# Patient Record
Sex: FEMALE | Race: WHITE | NOT HISPANIC OR LATINO | URBAN - METROPOLITAN AREA
[De-identification: names, ages, dates, MRNs, and addresses within clinical notes are randomized per-mention and may not be internally consistent; named-entity substitution may affect disease eponyms.]

---

## 2018-01-30 ENCOUNTER — OFFICE VISIT (OUTPATIENT)
Dept: OBSTETRICS AND GYNECOLOGY | Facility: CLINIC | Age: 23
End: 2018-01-30

## 2018-01-30 VITALS
HEART RATE: 67 BPM | HEIGHT: 64 IN | BODY MASS INDEX: 15.88 KG/M2 | DIASTOLIC BLOOD PRESSURE: 78 MMHG | WEIGHT: 93 LBS | SYSTOLIC BLOOD PRESSURE: 111 MMHG

## 2018-01-30 DIAGNOSIS — R30.0 DYSURIA: Primary | ICD-10-CM

## 2018-01-30 DIAGNOSIS — Z72.0 TOBACCO USE: ICD-10-CM

## 2018-01-30 DIAGNOSIS — N93.9 VAGINAL BLEEDING, ABNORMAL: ICD-10-CM

## 2018-01-30 LAB
B-HCG UR QL: NEGATIVE
BILIRUB BLD-MCNC: NEGATIVE MG/DL
GLUCOSE UR STRIP-MCNC: NEGATIVE MG/DL
INTERNAL NEGATIVE CONTROL: NEGATIVE
INTERNAL POSITIVE CONTROL: POSITIVE
KETONES UR QL: NEGATIVE
LEUKOCYTE EST, POC: NEGATIVE
Lab: NORMAL
NITRITE UR-MCNC: NEGATIVE MG/ML
PH UR: 7 [PH] (ref 5–8)
PROT UR STRIP-MCNC: ABNORMAL MG/DL
RBC # UR STRIP: NEGATIVE /UL
SP GR UR: 1.02 (ref 1–1.03)
UROBILINOGEN UR QL: NORMAL

## 2018-01-30 PROCEDURE — 99203 OFFICE O/P NEW LOW 30 MIN: CPT | Performed by: OBSTETRICS & GYNECOLOGY

## 2018-01-30 PROCEDURE — 99406 BEHAV CHNG SMOKING 3-10 MIN: CPT | Performed by: OBSTETRICS & GYNECOLOGY

## 2018-01-30 PROCEDURE — 81025 URINE PREGNANCY TEST: CPT | Performed by: OBSTETRICS & GYNECOLOGY

## 2018-01-30 RX ORDER — VITAMIN A ACETATE, .BETA.-CAROTENE, ASCORBIC ACID, CHOLECALCIFEROL, .ALPHA.-TOCOPHEROL ACETATE, DL-, THIAMINE MONONITRATE, RIBOFLAVIN, NIACINAMIDE, PYRIDOXINE HYDROCHLORIDE, FOLIC ACID, CYANOCOBALAMIN, CALCIUM CARBONATE, FERROUS FUMARATE, ZINC OXIDE, AND CUPRIC OXIDE 2000; 2000; 120; 400; 22; 1.84; 3; 20; 10; 1; 12; 200; 27; 25; 2 [IU]/1; [IU]/1; MG/1; [IU]/1; MG/1; MG/1; MG/1; MG/1; MG/1; MG/1; UG/1; MG/1; MG/1; MG/1; MG/1
TABLET ORAL
Refills: 0 | COMMUNITY
Start: 2017-11-02 | End: 2020-02-11

## 2018-01-30 RX ORDER — ALBUTEROL SULFATE 90 UG/1
AEROSOL, METERED RESPIRATORY (INHALATION)
Refills: 1 | COMMUNITY
Start: 2017-10-25 | End: 2020-02-11

## 2018-01-30 NOTE — PATIENT INSTRUCTIONS
Steps to Quit Smoking  Smoking tobacco can be harmful to your health and can affect almost every organ in your body. Smoking puts you, and those around you, at risk for developing many serious chronic diseases. Quitting smoking is difficult, but it is one of the best things that you can do for your health. It is never too late to quit.  What are the benefits of quitting smoking?  When you quit smoking, you lower your risk of developing serious diseases and conditions, such as:  · Lung cancer or lung disease, such as COPD.  · Heart disease.  · Stroke.  · Heart attack.  · Infertility.  · Osteoporosis and bone fractures.  Additionally, symptoms such as coughing, wheezing, and shortness of breath may get better when you quit. You may also find that you get sick less often because your body is stronger at fighting off colds and infections. If you are pregnant, quitting smoking can help to reduce your chances of having a baby of low birth weight.  How do I get ready to quit?  When you decide to quit smoking, create a plan to make sure that you are successful. Before you quit:  · Pick a date to quit. Set a date within the next two weeks to give you time to prepare.  · Write down the reasons why you are quitting. Keep this list in places where you will see it often, such as on your bathroom mirror or in your car or wallet.  · Identify the people, places, things, and activities that make you want to smoke (triggers) and avoid them. Make sure to take these actions:  ¨ Throw away all cigarettes at home, at work, and in your car.  ¨ Throw away smoking accessories, such as ashtrays and lighters.  ¨ Clean your car and make sure to empty the ashtray.  ¨ Clean your home, including curtains and carpets.  · Tell your family, friends, and coworkers that you are quitting. Support from your loved ones can make quitting easier.  · Talk with your health care provider about your options for quitting smoking.  · Find out what treatment  options are covered by your health insurance.  What strategies can I use to quit smoking?  Talk with your healthcare provider about different strategies to quit smoking. Some strategies include:  · Quitting smoking altogether instead of gradually lessening how much you smoke over a period of time. Research shows that quitting “cold turkey” is more successful than gradually quitting.  · Attending in-person counseling to help you build problem-solving skills. You are more likely to have success in quitting if you attend several counseling sessions. Even short sessions of 10 minutes can be effective.  · Finding resources and support systems that can help you to quit smoking and remain smoke-free after you quit. These resources are most helpful when you use them often. They can include:  ¨ Online chats with a counselor.  ¨ Telephone quitlines.  ¨ Printed self-help materials.  ¨ Support groups or group counseling.  ¨ Text messaging programs.  ¨ Mobile phone applications.  · Taking medicines to help you quit smoking. (If you are pregnant or breastfeeding, talk with your health care provider first.) Some medicines contain nicotine and some do not. Both types of medicines help with cravings, but the medicines that include nicotine help to relieve withdrawal symptoms. Your health care provider may recommend:  ¨ Nicotine patches, gum, or lozenges.  ¨ Nicotine inhalers or sprays.  ¨ Non-nicotine medicine that is taken by mouth.  Talk with your health care provider about combining strategies, such as taking medicines while you are also receiving in-person counseling. Using these two strategies together makes you more likely to succeed in quitting than if you used either strategy on its own.  If you are pregnant or breastfeeding, talk with your health care provider about finding counseling or other support strategies to quit smoking. Do not take medicine to help you quit smoking unless told to do so by your health care  provider.  What things can I do to make it easier to quit?  Quitting smoking might feel overwhelming at first, but there is a lot that you can do to make it easier. Take these important actions:  · Reach out to your family and friends and ask that they support and encourage you during this time. Call telephone quitlines, reach out to support groups, or work with a counselor for support.  · Ask people who smoke to avoid smoking around you.  · Avoid places that trigger you to smoke, such as bars, parties, or smoke-break areas at work.  · Spend time around people who do not smoke.  · Lessen stress in your life, because stress can be a smoking trigger for some people. To lessen stress, try:  ¨ Exercising regularly.  ¨ Deep-breathing exercises.  ¨ Yoga.  ¨ Meditating.  ¨ Performing a body scan. This involves closing your eyes, scanning your body from head to toe, and noticing which parts of your body are particularly tense. Purposefully relax the muscles in those areas.  · Download or purchase mobile phone or tablet apps (applications) that can help you stick to your quit plan by providing reminders, tips, and encouragement. There are many free apps, such as QuitGuide from the CDC (Centers for Disease Control and Prevention). You can find other support for quitting smoking (smoking cessation) through smokefree.gov and other websites.  How will I feel when I quit smoking?  Within the first 24 hours of quitting smoking, you may start to feel some withdrawal symptoms. These symptoms are usually most noticeable 2-3 days after quitting, but they usually do not last beyond 2-3 weeks. Changes or symptoms that you might experience include:  · Mood swings.  · Restlessness, anxiety, or irritation.  · Difficulty concentrating.  · Dizziness.  · Strong cravings for sugary foods in addition to nicotine.  · Mild weight gain.  · Constipation.  · Nausea.  · Coughing or a sore throat.  · Changes in how your medicines work in your  body.  · A depressed mood.  · Difficulty sleeping (insomnia).  After the first 2-3 weeks of quitting, you may start to notice more positive results, such as:  · Improved sense of smell and taste.  · Decreased coughing and sore throat.  · Slower heart rate.  · Lower blood pressure.  · Clearer skin.  · The ability to breathe more easily.  · Fewer sick days.  Quitting smoking is very challenging for most people. Do not get discouraged if you are not successful the first time. Some people need to make many attempts to quit before they achieve long-term success. Do your best to stick to your quit plan, and talk with your health care provider if you have any questions or concerns.  This information is not intended to replace advice given to you by your health care provider. Make sure you discuss any questions you have with your health care provider.  Document Released: 12/12/2002 Document Revised: 08/15/2017 Document Reviewed: 05/03/2016  [email or web address] Interactive Patient Education © 2017 [employer]

## 2018-01-30 NOTE — PROGRESS NOTES
Selvin Palomares is a 22 y.o. female.     Cc:  Irregular vaginal bleeding    History of Present Illness - 22 year old female with irregular vaginal bleeding for one month.  Patient with regular cycles.  She completed her last cycle and then one week later, had unexpected vaginal bleeding.  The problem is intermittent and random.  She is not on hormonal medication.  She has a previous history of STD.  She is having unprotected intercourse, but does not believe she is pregnant.  She does not have pelvic or lower abdominal pain.  She has vague pain in the upper abdomen.  She has not had a previous work up for this problem.    The following portions of the patient's history were reviewed and updated as appropriate:   She  has no past medical history on file.  She  has no past surgical history on file.  Her family history includes Heart attack in her paternal grandfather; No Known Problems in her father and mother.  She  reports that she has been smoking.  She does not have any smokeless tobacco history on file. She reports that she drinks alcohol. She reports that she uses illicit drugs, including Marijuana.  Current Outpatient Prescriptions   Medication Sig Dispense Refill   • Prenatal Vit-Fe Fumarate-FA (PNV PRENATAL PLUS MULTIVITAMIN) 27-1 MG tablet TAKE 1 TABLET BY MOUTH ONE TIME A DAY  0   • VENTOLIN  (90 Base) MCG/ACT inhaler inhale 2 puffs by mouth every 4 to 6 hours as needed  1     No current facility-administered medications for this visit.      She has No Known Allergies..    Review of Systems   Constitutional: Negative for chills and fever.   HENT: Negative for dental problem and voice change.    Eyes: Negative for photophobia and visual disturbance.   Respiratory: Negative for chest tightness and shortness of breath.    Cardiovascular: Negative for chest pain and palpitations.   Gastrointestinal: Positive for constipation and diarrhea. Negative for vomiting.   Genitourinary: Positive for  menstrual problem and vaginal bleeding. Negative for dysuria and frequency.   Musculoskeletal: Negative for back pain and gait problem.   Skin: Negative for pallor and rash.   Neurological: Negative for tremors and speech difficulty.   Hematological: Negative for adenopathy. Does not bruise/bleed easily.   Psychiatric/Behavioral: Negative for behavioral problems. The patient is not nervous/anxious.        Objective   Physical Exam   Constitutional: She appears well-developed and well-nourished.   HENT:   Head: Normocephalic.   Nose: Nose normal.   Eyes: Conjunctivae are normal.   Neck: Normal range of motion. Neck supple. No thyromegaly present.   Cardiovascular: Normal rate, regular rhythm and normal heart sounds.    Pulmonary/Chest: Effort normal. She has no wheezes. She has no rales.   Abdominal: Soft. There is no tenderness. There is no rebound and no guarding.   Genitourinary: Vagina normal and uterus normal. Pelvic exam was performed with patient supine. There is no tenderness or lesion on the right labia. There is no tenderness or lesion on the left labia. Cervix exhibits no motion tenderness. Right adnexum displays no mass. Left adnexum displays no tenderness. No foreign body in the vagina. No vaginal discharge found.   Musculoskeletal: Normal range of motion. She exhibits no edema.   Neurological: She is alert. Coordination normal.   Skin: Skin is warm and dry.   Psychiatric: She has a normal mood and affect. Her behavior is normal. Judgment and thought content normal.   Vitals reviewed.      Assessment/Plan   Venera was seen today for gynecologic exam.    Diagnoses and all orders for this visit:    Dysuria/Vaginal bleeding, abnormal  -     POC Urinalysis Dipstick, Automated  -     NuSwab VG+ - Swab, Vagina  -     Urine Culture - Urine, Urine, Clean Catch  -     Exam is normal.  Await cultures.  If cultures negative, patient to observe.  She refuse to take hormonal contraception.  We discussed the  importance of planning pregnancy.    Tobacco Use        -      Discussed importance of quitting.  Patient is interested in quitting and is working toward this.  Discussed strategies to quit.  I spent approximately 3 minutes discussing.       Ez Smallwood MD

## 2018-02-01 LAB
BACTERIA UR CULT: ABNORMAL
BACTERIA UR CULT: ABNORMAL
OTHER ANTIBIOTIC SUSC ISLT: ABNORMAL

## 2018-02-02 ENCOUNTER — TELEPHONE (OUTPATIENT)
Dept: OBSTETRICS AND GYNECOLOGY | Facility: CLINIC | Age: 23
End: 2018-02-02

## 2018-02-02 RX ORDER — NITROFURANTOIN 25; 75 MG/1; MG/1
100 CAPSULE ORAL EVERY 12 HOURS SCHEDULED
Qty: 14 CAPSULE | Refills: 0 | Status: SHIPPED | OUTPATIENT
Start: 2018-02-02 | End: 2018-02-09

## 2018-02-02 NOTE — TELEPHONE ENCOUNTER
----- Message from Ez Smallwood MD sent at 2/2/2018 10:19 AM EST -----  Ivet - Let her know that her final urine test shows she has a urinary tract infection.  I called her in a week of antibiotics.

## 2018-02-03 LAB
A VAGINAE DNA VAG QL NAA+PROBE: ABNORMAL SCORE
BVAB2 DNA VAG QL NAA+PROBE: ABNORMAL SCORE
C ALBICANS DNA VAG QL NAA+PROBE: NEGATIVE
C GLABRATA DNA VAG QL NAA+PROBE: NEGATIVE
C TRACH RRNA SPEC QL NAA+PROBE: NEGATIVE
MEGA1 DNA VAG QL NAA+PROBE: ABNORMAL SCORE
N GONORRHOEA RRNA SPEC QL NAA+PROBE: NEGATIVE
T VAGINALIS RRNA SPEC QL NAA+PROBE: NEGATIVE

## 2018-02-05 RX ORDER — METRONIDAZOLE 500 MG/1
500 TABLET ORAL 2 TIMES DAILY
Qty: 14 TABLET | Refills: 0 | Status: SHIPPED | OUTPATIENT
Start: 2018-02-05 | End: 2020-02-11

## 2018-02-06 ENCOUNTER — TELEPHONE (OUTPATIENT)
Dept: OBSTETRICS AND GYNECOLOGY | Facility: CLINIC | Age: 23
End: 2018-02-06

## 2018-02-06 NOTE — TELEPHONE ENCOUNTER
----- Message from Ez Smallwood MD sent at 2/5/2018  5:32 PM EST -----  Gloria - Please let her know that she has a bacterial infection.  I called her in antibiotics.

## 2019-02-08 ENCOUNTER — TELEPHONE (OUTPATIENT)
Dept: OBSTETRICS AND GYNECOLOGY | Facility: CLINIC | Age: 24
End: 2019-02-08

## 2020-02-11 ENCOUNTER — OFFICE VISIT (OUTPATIENT)
Dept: OBSTETRICS AND GYNECOLOGY | Facility: CLINIC | Age: 25
End: 2020-02-11

## 2020-02-11 VITALS
WEIGHT: 105 LBS | BODY MASS INDEX: 17.93 KG/M2 | HEIGHT: 64 IN | DIASTOLIC BLOOD PRESSURE: 78 MMHG | SYSTOLIC BLOOD PRESSURE: 112 MMHG

## 2020-02-11 DIAGNOSIS — Z01.419 VISIT FOR GYNECOLOGIC EXAMINATION: Primary | ICD-10-CM

## 2020-02-11 PROCEDURE — 99395 PREV VISIT EST AGE 18-39: CPT | Performed by: OBSTETRICS & GYNECOLOGY

## 2020-02-11 NOTE — PROGRESS NOTES
"[facility]  [address], [address]  [place], [place] [address]  Phone: [phone] / Fax:  [phone]      2020    [address] [facility] [address]    Provider, No Known    Chief Complaint   Patient presents with   • Gynecologic Exam     Annual Exam. Patient is interested in Birth Control, possible Iud.       Lena Palomares is here for annual gynecologic exam.  HPI - Patient with regular but painful periods.  She would like something to improve symptoms.  She is currently not sexually active and does not want to be tested for STDs.    History reviewed. No pertinent past medical history.    History reviewed. No pertinent surgical history.    No Known Allergies    Social History     Socioeconomic History   • Marital status: Single     Spouse name: Not on file   • Number of children: Not on file   • Years of education: Not on file   • Highest education level: Not on file   Tobacco Use   • Smoking status: Current Every Day Smoker   Substance and Sexual Activity   • Alcohol use: Yes     Comment: social   • Drug use: Yes     Types: Marijuana   • Sexual activity: Not Currently     Partners: Male     Birth control/protection: None       Family History   Problem Relation Age of Onset   • No Known Problems Father    • No Known Problems Mother    • Heart attack Paternal Grandfather        Patient's last menstrual period was 2020 (approximate).    OB History        0    Para   0    Term   0       0    AB   0    Living   0       SAB   0    TAB   0    Ectopic   0    Molar   0    Multiple   0    Live Births   0                Vitals:    20 1053   BP: 112/78   Weight: 47.6 kg (105 lb)   Height: 162.6 cm (64\")       Physical Exam   Constitutional: She appears well-developed and well-nourished.   Genitourinary: Vagina normal and uterus normal. Pelvic exam was performed with patient supine. There is no tenderness or lesion on the right labia. There is no tenderness or lesion on the " left labia. Right adnexum does not display tenderness and does not display fullness. Left adnexum does not display tenderness and does not display fullness. Cervix does not exhibit motion tenderness or lesion.   HENT:   Right Ear: External ear normal.   Left Ear: External ear normal.   Nose: Nose normal.   Eyes: Conjunctivae are normal.   Neck: Normal range of motion. Neck supple. No thyromegaly present.   Cardiovascular: Normal rate, regular rhythm and normal heart sounds.   Pulmonary/Chest: Effort normal. No stridor. She has no wheezes. Right breast exhibits no mass and no nipple discharge. Left breast exhibits no mass and no nipple discharge.   Abdominal: Soft. She exhibits no mass. There is no rebound and no guarding.   Musculoskeletal: Normal range of motion. She exhibits no edema.   Neurological: She is alert. Coordination normal.   Skin: Skin is warm and dry.   Psychiatric: She has a normal mood and affect. Her behavior is normal. Judgment and thought content normal.   Vitals reviewed.      Venera was seen today for gynecologic exam.    Diagnoses and all orders for this visit:    Visit for gynecologic examination  -     IGP, Rfx Aptima HPV ASCU  -     Discussed contraception at length.  Patient would like to proceed with Annabelle IUD.  Pamphlets given.  Discussed pros/cons.        Ez Smallwood MD

## 2020-02-14 LAB
CONV .: NORMAL
CYTOLOGIST CVX/VAG CYTO: NORMAL
CYTOLOGY CVX/VAG DOC CYTO: NORMAL
CYTOLOGY CVX/VAG DOC THIN PREP: NORMAL
DX ICD CODE: NORMAL
HIV 1 & 2 AB SER-IMP: NORMAL
Lab: NORMAL
OTHER STN SPEC: NORMAL
STAT OF ADQ CVX/VAG CYTO-IMP: NORMAL

## 2020-02-21 ENCOUNTER — TELEPHONE (OUTPATIENT)
Dept: OBSTETRICS AND GYNECOLOGY | Facility: CLINIC | Age: 25
End: 2020-02-21

## 2020-03-17 ENCOUNTER — PROCEDURE VISIT (OUTPATIENT)
Dept: OBSTETRICS AND GYNECOLOGY | Facility: CLINIC | Age: 25
End: 2020-03-17

## 2020-03-17 VITALS
SYSTOLIC BLOOD PRESSURE: 110 MMHG | BODY MASS INDEX: 18.27 KG/M2 | WEIGHT: 107 LBS | HEIGHT: 64 IN | DIASTOLIC BLOOD PRESSURE: 68 MMHG

## 2020-03-17 DIAGNOSIS — Z30.430 ENCOUNTER FOR IUD INSERTION: Primary | ICD-10-CM

## 2020-03-17 LAB
B-HCG UR QL: NEGATIVE
INTERNAL NEGATIVE CONTROL: NEGATIVE
INTERNAL POSITIVE CONTROL: POSITIVE
Lab: NORMAL

## 2020-03-17 PROCEDURE — 81025 URINE PREGNANCY TEST: CPT | Performed by: OBSTETRICS & GYNECOLOGY

## 2020-03-17 PROCEDURE — 58300 INSERT INTRAUTERINE DEVICE: CPT | Performed by: OBSTETRICS & GYNECOLOGY

## 2020-03-17 NOTE — PROGRESS NOTES
IUD Insertion Procedure Note    Pre-operative Diagnosis: Desires contraception    Post-operative Diagnosis: same    Indications: contraception    Procedure Details   Urine pregnancy test was done today and result was negative.  The risks (including infection, bleeding, pain, and uterine perforation) and benefits of the procedure were explained to the patient and Verbal informed consent was obtained.      Cervix cleansed with Betadine. Uterus sounded to 7 cm. IUD inserted without difficulty. String visible and trimmed.    IUD Information:  Annabelle, Lot # [id number], Expiration date 1/2021, NDC 05329-550-61.    Condition:  Stable    Complications:  None  Patient tolerated the procedure well without complications.    Plan:    The patient was advised to call for any fever or for prolonged or severe pain or bleeding. She was advised to use NSAID as needed for mild to moderate pain.   No intercourse for one week.  Follow up in 3 weeks for IUD check.    Ez Smallwood MD

## 2020-04-03 ENCOUNTER — OFFICE VISIT (OUTPATIENT)
Dept: OBSTETRICS AND GYNECOLOGY | Facility: CLINIC | Age: 25
End: 2020-04-03

## 2020-04-03 ENCOUNTER — PROCEDURE VISIT (OUTPATIENT)
Dept: OBSTETRICS AND GYNECOLOGY | Facility: CLINIC | Age: 25
End: 2020-04-03

## 2020-04-03 VITALS
HEIGHT: 64 IN | BODY MASS INDEX: 18.1 KG/M2 | SYSTOLIC BLOOD PRESSURE: 122 MMHG | DIASTOLIC BLOOD PRESSURE: 80 MMHG | WEIGHT: 106 LBS

## 2020-04-03 DIAGNOSIS — R10.2 PELVIC PAIN: Primary | ICD-10-CM

## 2020-04-03 DIAGNOSIS — R18.8 FREE FLUID IN PELVIS: ICD-10-CM

## 2020-04-03 DIAGNOSIS — R10.2 ACUTE PELVIC PAIN: Primary | ICD-10-CM

## 2020-04-03 DIAGNOSIS — N83.202 CYST OF LEFT OVARY: ICD-10-CM

## 2020-04-03 PROCEDURE — 76830 TRANSVAGINAL US NON-OB: CPT | Performed by: OBSTETRICS & GYNECOLOGY

## 2020-04-03 PROCEDURE — 99213 OFFICE O/P EST LOW 20 MIN: CPT | Performed by: OBSTETRICS & GYNECOLOGY

## 2020-04-03 RX ORDER — HYDROCODONE BITARTRATE AND ACETAMINOPHEN 5; 325 MG/1; MG/1
1 TABLET ORAL EVERY 6 HOURS PRN
Qty: 10 TABLET | Refills: 0 | Status: SHIPPED | OUTPATIENT
Start: 2020-04-03 | End: 2020-05-19

## 2020-04-03 RX ORDER — DOXYCYCLINE HYCLATE 100 MG/1
100 CAPSULE ORAL 2 TIMES DAILY
Qty: 14 CAPSULE | Refills: 0 | Status: SHIPPED | OUTPATIENT
Start: 2020-04-03 | End: 2020-04-10

## 2020-04-03 NOTE — PROGRESS NOTES
"Selvin Palomares is a 24 y.o. female.     Cc:  Pelvic pain    History of Present Illness - 24 year old female with acute onset pelvic pain.  She had IUD place 2 weeks ago.  Several days later, patient had onset of pelvic pain/discomfort that was initially mild.  It progressed and become more significant and 24 to 48 hours ago, patient felt pain was severe and went to ER.  She reports pain in pelvis with nausea.  She was diagnosed with possible \"malpositioned\" IUD and instructed to see me.  She has not been sexually active since around the time IUD was placed.  She is taking nausea medication and NSAIDs.    The following portions of the patient's history were reviewed and updated as appropriate:   She  has no past medical history on file.  She  reports that she has been smoking. She does not have any smokeless tobacco history on file. She reports that she drinks alcohol. She reports that she has current or past drug history. Drug: Marijuana.  Current Outpatient Medications   Medication Sig Dispense Refill   • doxycycline (VIBRAMYCIN) 100 MG capsule Take 1 capsule by mouth 2 (Two) Times a Day for 7 days. 14 capsule 0   • HYDROcodone-acetaminophen (Norco) 5-325 MG per tablet Take 1 tablet by mouth Every 6 (Six) Hours As Needed for Moderate Pain . 10 tablet 0     No current facility-administered medications for this visit.      She has No Known Allergies..    Review of Systems   Constitutional: Negative for chills and fever.   Gastrointestinal: Positive for abdominal pain and nausea. Negative for vomiting.   Genitourinary: Positive for pelvic pain. Negative for dysuria and frequency.       Objective   Physical Exam   Constitutional: She appears well-developed and well-nourished.   Abdominal: Soft. There is no tenderness. There is no rebound and no guarding.   Genitourinary: Pelvic exam was performed with patient supine. There is no tenderness or lesion on the right labia. There is no tenderness or lesion on the " left labia. Uterus is tender. Cervix exhibits no motion tenderness, no discharge and no friability. Right adnexum displays no tenderness and no fullness. Left adnexum displays tenderness and fullness. There is bleeding in the vagina.   Genitourinary Comments: IUD strings noted   Musculoskeletal: Normal range of motion.   Neurological: She is alert. Coordination normal.   Skin: Skin is warm and dry.   Psychiatric: She has a normal mood and affect. Her behavior is normal. Judgment and thought content normal.   Vitals reviewed.      Assessment/Plan   Venera was seen today for follow-up.    Diagnoses and all orders for this visit:    Acute pelvic pain  -     doxycycline (VIBRAMYCIN) 100 MG capsule; Take 1 capsule by mouth 2 (Two) Times a Day for 7 days.  -     HYDROcodone-acetaminophen (Norco) 5-325 MG per tablet; Take 1 tablet by mouth Every 6 (Six) Hours As Needed for Moderate Pain .  -     NuSwab VG+ - Swab, Vagina  -     Ultrasound today with fluid in pelvis and complex left adnexal mass.  IUD in uterus but in lower portion.  I would treat with course of antibiotics and patient to follow up in 72 hours.  Discussed importance of antibiotics.  The likeliest diagnosis is a ruptured ovarian cyst.  However, if patient does not improve, consider removal of IUD.    Ez Smallwood MD

## 2020-04-07 ENCOUNTER — OFFICE VISIT (OUTPATIENT)
Dept: OBSTETRICS AND GYNECOLOGY | Facility: CLINIC | Age: 25
End: 2020-04-07

## 2020-04-07 VITALS
DIASTOLIC BLOOD PRESSURE: 78 MMHG | WEIGHT: 104 LBS | SYSTOLIC BLOOD PRESSURE: 118 MMHG | BODY MASS INDEX: 17.75 KG/M2 | HEIGHT: 64 IN

## 2020-04-07 DIAGNOSIS — Z30.432 ENCOUNTER FOR IUD REMOVAL: ICD-10-CM

## 2020-04-07 DIAGNOSIS — R10.2 PELVIC PAIN: Primary | ICD-10-CM

## 2020-04-07 PROCEDURE — 58301 REMOVE INTRAUTERINE DEVICE: CPT | Performed by: OBSTETRICS & GYNECOLOGY

## 2020-04-07 PROCEDURE — 99213 OFFICE O/P EST LOW 20 MIN: CPT | Performed by: OBSTETRICS & GYNECOLOGY

## 2020-04-07 RX ORDER — ONDANSETRON 4 MG/1
4 TABLET, ORALLY DISINTEGRATING ORAL
COMMUNITY
End: 2020-05-19

## 2020-04-07 RX ORDER — DOXYCYCLINE HYCLATE 100 MG/1
100 CAPSULE ORAL
COMMUNITY
End: 2020-05-19

## 2020-04-07 NOTE — PROGRESS NOTES
Subjective   Lena Palomares is a 24 y.o. female.     Cc:  Pelvic pain    History of Present Illness - Patient is a 24 year old nulligravid who developed acute onset of pelvic pain several weeks after IUD placement.  She was initially seen in an ER and radiologic studies identified IUD in lower body of uterus.  In addition patient with a small ovarian cyst.  She was discharged to home and instructed to follow up with me.  She was seen last week and still complained of tenderness.  Ultrasound revealed IUD in uterus, but not at fundus; however, patient with left sided ovarian cyst and moderate fluid in pelvis.  Patient with tenderness on exam and was given antibiotics with close follow up.  She reports her pain is improved but requesting IUD be removed.  She has been compliant with medication.  She denies bowel or bladder issues.  No fevers/chills.    The following portions of the patient's history were reviewed and updated as appropriate:   She  has no past medical history on file.  She  reports that she has been smoking. She does not have any smokeless tobacco history on file. She reports that she drinks alcohol. She reports that she has current or past drug history. Drug: Marijuana.  Current Outpatient Medications   Medication Sig Dispense Refill   • doxycycline (VIBRAMYCIN) 100 MG capsule Take 1 capsule by mouth 2 (Two) Times a Day for 7 days. 14 capsule 0   • doxycycline (VIBRAMYCIN) 100 MG capsule Take 100 mg by mouth.     • HYDROcodone-acetaminophen (Norco) 5-325 MG per tablet Take 1 tablet by mouth Every 6 (Six) Hours As Needed for Moderate Pain . 10 tablet 0   • ondansetron ODT (ZOFRAN-ODT) 4 MG disintegrating tablet Take 4 mg by mouth.       No current facility-administered medications for this visit.      She has No Known Allergies..    Review of Systems   Constitutional: Negative for chills and fever.   Gastrointestinal: Negative for nausea and vomiting.   Genitourinary: Positive for pelvic pain. Negative for  vaginal bleeding and vaginal discharge.       Objective   Physical Exam   Constitutional: She appears well-developed and well-nourished.   Abdominal: Soft. She exhibits no mass. There is no tenderness. There is no rebound and no guarding.   Genitourinary: Vagina normal. Pelvic exam was performed with patient supine. There is no tenderness or lesion on the right labia. There is no tenderness or lesion on the left labia. Uterus is not tender. Cervix exhibits no motion tenderness, no discharge and no friability. Right adnexum displays no tenderness and no fullness. Left adnexum displays no tenderness and no fullness.   Psychiatric: She has a normal mood and affect. Her behavior is normal. Judgment and thought content normal.   Vitals reviewed.    IUD Removal Procedure Note    Type of IUD:  Mirena  Date of insertion:  March 17th, 2020  Reason for removal:  Side effect: pelvic pain  Other relevant history/information:  none    Procedure Time Out Documentation      Procedure Details  IUD strings visible:  yes  Local anesthesia:  None  Tenaculum used:  None  Removal:  IUD strings grasped and IUD removed intact with gentle traction.  The patient tolerated the procedure well.    All appropriate instructions regarding removal were reviewed.    Patient tolerated the procedure well without complications.    Plans for contraception:  condoms    Other follow-up needed:  none    The patient was advised to call for any fever or for prolonged or severe pain or bleeding. She was advised to use NSAID as needed for mild to moderate pain.       Assessment/Plan   Venera was seen today for follow-up.    Diagnoses and all orders for this visit:    Pelvic pain        -     Etiology is uncertain.  Ovarian cyst, malposition of IUD or PID cannot be excluded.  IUD was removed.  Check CBC.  Continue close follow up within one week.  Encounter for IUD removal  -     Reference Histopathology  -     Sent for culture.    Ez Smallwood MD

## 2020-04-09 ENCOUNTER — TELEPHONE (OUTPATIENT)
Dept: OBSTETRICS AND GYNECOLOGY | Facility: CLINIC | Age: 25
End: 2020-04-09

## 2020-04-09 LAB
A VAGINAE DNA VAG QL NAA+PROBE: ABNORMAL SCORE
BVAB2 DNA VAG QL NAA+PROBE: ABNORMAL SCORE
C ALBICANS DNA VAG QL NAA+PROBE: NEGATIVE
C GLABRATA DNA VAG QL NAA+PROBE: NEGATIVE
C TRACH DNA VAG QL NAA+PROBE: POSITIVE
MEGA1 DNA VAG QL NAA+PROBE: ABNORMAL SCORE
N GONORRHOEA DNA VAG QL NAA+PROBE: NEGATIVE
T VAGINALIS DNA VAG QL NAA+PROBE: NEGATIVE

## 2020-04-09 NOTE — TELEPHONE ENCOUNTER
Kaylen.    Let her know that her testing shows she has an STD.  She has Chlamydia.  If she has a partner/boyfriend, he has it also.  He needs to go to his doctor or to the [facility] department.  She should not have sexual contact with anyone until she is seen by me again.  The antibiotics she was given last week should cure the infection.    Thanks    Dr Selin Leger.

## 2020-04-10 LAB
DX ICD CODE: NORMAL
PATH REPORT.FINAL DX SPEC: NORMAL
PATH REPORT.GROSS SPEC: NORMAL
PATH REPORT.RELEVANT HX SPEC: NORMAL
PATH REPORT.SITE OF ORIGIN SPEC: NORMAL
PATHOLOGIST NAME: NORMAL
PAYMENT PROCEDURE: NORMAL

## 2020-04-10 NOTE — TELEPHONE ENCOUNTER
Patient aware of results and need for JAMI, scheduled for May 19th. 4-/lw.  Left message for patient to call back. 4-/lw

## 2020-04-14 ENCOUNTER — TELEPHONE (OUTPATIENT)
Dept: OBSTETRICS AND GYNECOLOGY | Facility: CLINIC | Age: 25
End: 2020-04-14

## 2020-04-14 NOTE — TELEPHONE ENCOUNTER
Late Entry. Per the Lab on this day of request, specimen was already to old to request culture performed. 4-14-20/lw    ----- Message from Ez Smallwood MD sent at 4/10/2020  2:05 PM EDT -----  LAW - Find out if they can culture it.

## 2020-04-14 NOTE — TELEPHONE ENCOUNTER
Pt called to reschedule appt from today to 6/9/2020, states she does not feel comfortable going out due to COVID-19.  She is requesting abx for +chlamydia from 4/3/2020, states you were treating for a cyst last week with a 3 day abx, but feels like she may need something different for the sti.  Please advise.     Pt # [phone]

## 2020-04-14 NOTE — TELEPHONE ENCOUNTER
Davina    She was fully treated.  She was given Doxycycline on April 3rd and given a 7 day course.  That is the treatment for Chlamydia.  If she is not feeling well or still having symptoms, she needs to return to have further STD testing done.       Her sexual partner needs to go to an Immediate or Urgent Care to be treated.    Dr Smallwood

## 2020-05-19 ENCOUNTER — OFFICE VISIT (OUTPATIENT)
Dept: OBSTETRICS AND GYNECOLOGY | Facility: CLINIC | Age: 25
End: 2020-05-19

## 2020-05-19 VITALS
HEIGHT: 64 IN | DIASTOLIC BLOOD PRESSURE: 68 MMHG | BODY MASS INDEX: 18.78 KG/M2 | WEIGHT: 110 LBS | SYSTOLIC BLOOD PRESSURE: 110 MMHG

## 2020-05-19 DIAGNOSIS — Z30.09 ENCOUNTER FOR GENERAL COUNSELING AND ADVICE ON CONTRACEPTIVE MANAGEMENT: ICD-10-CM

## 2020-05-19 DIAGNOSIS — A56.2 ACUTE GENITOURINARY CHLAMYDIA TRACHOMATIS INFECTION: Primary | ICD-10-CM

## 2020-05-19 PROCEDURE — 99213 OFFICE O/P EST LOW 20 MIN: CPT | Performed by: OBSTETRICS & GYNECOLOGY

## 2020-05-19 RX ORDER — NORGESTIMATE AND ETHINYL ESTRADIOL 0.25-0.035
1 KIT ORAL DAILY
Qty: 1 PACKAGE | Refills: 5 | Status: SHIPPED | OUTPATIENT
Start: 2020-05-19 | End: 2020-09-30 | Stop reason: SDUPTHER

## 2020-05-19 NOTE — PROGRESS NOTES
Subjective   Lena Palomares is a 24 y.o. female.     Cc:  Chlamydia follow up and birth control    History of Present Illness - 24 year old nulligravid female for follow up.  She was diagnosed with Chlamydia and treated with Zithromax.  Her partner was also treated.  She denies any discharge or pelvic pain.  No fevers or chills.  She had an IUD but this was removed at the time of her Chlamydia diagnosis due to pelvic pain.  She is interested in starting on OCP.  She is currently using condoms.    The following portions of the patient's history were reviewed and updated as appropriate:   She  has a past medical history of Chlamydia.  She  reports that she has been smoking. She does not have any smokeless tobacco history on file. She reports that she drinks alcohol. She reports that she has current or past drug history. Drug: Marijuana.  Current Outpatient Medications   Medication Sig Dispense Refill   • norgestimate-ethinyl estradiol (Sprintec 28) 0.25-35 MG-MCG per tablet Take 1 tablet by mouth Daily. 1 package 5     No current facility-administered medications for this visit.      She has No Known Allergies..    Review of Systems   Constitutional: Negative for chills and fever.   Genitourinary: Negative for pelvic pain, vaginal discharge and vaginal pain.       Objective   Physical Exam   Constitutional: She appears well-developed and well-nourished.   Genitourinary: Pelvic exam was performed with patient supine. There is no tenderness or lesion on the right labia. There is no tenderness or lesion on the left labia. Cervix exhibits no motion tenderness, no discharge and no friability. No foreign body in the vagina. No vaginal discharge found.   Skin: Skin is warm and dry.   Psychiatric: She has a normal mood and affect. Her behavior is normal. Judgment and thought content normal.   Vitals reviewed.      Assessment/Plan   Lena was seen today for follow-up.    Diagnoses and all orders for this visit:    Acute  genitourinary Chlamydia trachomatis infection  -     Chlamydia trachomatis, Neisseria gonorrhoeae, Trichomonas vaginalis, PCR - Swab, Vagina  -     Discussed surveillance for STD and regular screening.    Encounter for general counseling and advice on contraceptive management  -     norgestimate-ethinyl estradiol (Sprintec 28) 0.25-35 MG-MCG per tablet; Take 1 tablet by mouth Daily.  -     Discussed contraceptive options and patient to start OCP.    Ez Smallwood MD

## 2020-05-20 LAB
C TRACH RRNA SPEC QL NAA+PROBE: NEGATIVE
N GONORRHOEA RRNA SPEC QL NAA+PROBE: NEGATIVE
T VAGINALIS DNA SPEC QL NAA+PROBE: NEGATIVE

## 2020-05-21 ENCOUNTER — TELEPHONE (OUTPATIENT)
Dept: OBSTETRICS AND GYNECOLOGY | Facility: CLINIC | Age: 25
End: 2020-05-21

## 2020-05-21 NOTE — TELEPHONE ENCOUNTER
Left message for patient to call back. 5-/lw  ----- Message from Ez Smallwood MD sent at 5/20/2020  4:40 PM EDT -----  LAW - Let her her know that her STD testing was negative.

## 2020-09-29 DIAGNOSIS — Z30.09 ENCOUNTER FOR GENERAL COUNSELING AND ADVICE ON CONTRACEPTIVE MANAGEMENT: ICD-10-CM

## 2020-09-30 DIAGNOSIS — Z30.09 ENCOUNTER FOR GENERAL COUNSELING AND ADVICE ON CONTRACEPTIVE MANAGEMENT: ICD-10-CM

## 2020-09-30 RX ORDER — NORGESTIMATE AND ETHINYL ESTRADIOL 0.25-0.035
KIT ORAL
Qty: 28 TABLET | Refills: 0 | Status: SHIPPED | OUTPATIENT
Start: 2020-09-30 | End: 2021-06-15

## 2020-09-30 RX ORDER — NORGESTIMATE AND ETHINYL ESTRADIOL 0.25-0.035
1 KIT ORAL DAILY
Qty: 1 PACKAGE | Refills: 5 | Status: SHIPPED | OUTPATIENT
Start: 2020-09-30 | End: 2021-07-20

## 2021-06-14 DIAGNOSIS — Z30.09 ENCOUNTER FOR GENERAL COUNSELING AND ADVICE ON CONTRACEPTIVE MANAGEMENT: ICD-10-CM

## 2021-06-15 ENCOUNTER — TELEPHONE (OUTPATIENT)
Dept: OBSTETRICS AND GYNECOLOGY | Facility: CLINIC | Age: 26
End: 2021-06-15

## 2021-06-15 RX ORDER — NORGESTIMATE AND ETHINYL ESTRADIOL 0.25-0.035
KIT ORAL
Qty: 28 TABLET | Refills: 1 | Status: SHIPPED | OUTPATIENT
Start: 2021-06-15 | End: 2021-07-20

## 2021-06-15 NOTE — TELEPHONE ENCOUNTER
Nidhi    Let her know that I gave her a refill on her medication.  She needs to be scheduled for an annual.    Thanks    Selin

## 2021-07-20 ENCOUNTER — OFFICE VISIT (OUTPATIENT)
Dept: OBSTETRICS AND GYNECOLOGY | Facility: CLINIC | Age: 26
End: 2021-07-20

## 2021-07-20 VITALS
DIASTOLIC BLOOD PRESSURE: 66 MMHG | SYSTOLIC BLOOD PRESSURE: 101 MMHG | HEIGHT: 64 IN | BODY MASS INDEX: 20.14 KG/M2 | WEIGHT: 118 LBS

## 2021-07-20 DIAGNOSIS — Z30.09 ENCOUNTER FOR GENERAL COUNSELING AND ADVICE ON CONTRACEPTIVE MANAGEMENT: ICD-10-CM

## 2021-07-20 DIAGNOSIS — Z01.419 VISIT FOR GYNECOLOGIC EXAMINATION: Primary | ICD-10-CM

## 2021-07-20 PROCEDURE — 99395 PREV VISIT EST AGE 18-39: CPT | Performed by: OBSTETRICS & GYNECOLOGY

## 2021-07-20 RX ORDER — NORGESTIMATE AND ETHINYL ESTRADIOL 0.25-0.035
1 KIT ORAL DAILY
Qty: 28 TABLET | Refills: 11 | Status: SHIPPED | OUTPATIENT
Start: 2021-07-20

## 2021-07-20 NOTE — PROGRESS NOTES
"[facility]  [address] [facility], [address]  [place], [place] [address]  Phone: [phone] / Fax:  [phone]      2021    [address] [facility] [address]    Provider, No Known    Chief Complaint   Patient presents with   • Gynecologic Exam     Annual Exam, last pap 2-20 NL..        Lena Palomares is here for annual gynecologic exam.  HPI - Patient with regular, but light, cycles.  She is on OCP.  She declines STD testing.  Her last pap was 1.5 years ago and was normal.    Past Medical History:   Diagnosis Date   • Chlamydia        History reviewed. No pertinent surgical history.    No Known Allergies    Social History     Socioeconomic History   • Marital status: Single     Spouse name: Not on file   • Number of children: Not on file   • Years of education: Not on file   • Highest education level: Not on file   Tobacco Use   • Smoking status: Current Every Day Smoker   Substance and Sexual Activity   • Alcohol use: Yes     Comment: social   • Drug use: Yes     Types: Marijuana   • Sexual activity: Yes     Partners: Male     Birth control/protection: OCP       Family History   Problem Relation Age of Onset   • No Known Problems Father    • No Known Problems Mother    • Heart attack Paternal Grandfather    • Cancer Maternal Grandfather        Patient's last menstrual period was 2021 (approximate).    OB History        0    Para   0    Term   0       0    AB   0    Living   0       SAB   0    TAB   0    Ectopic   0    Molar   0    Multiple   0    Live Births   0                Vitals:    21 0922   BP: 101/66   Weight: 53.5 kg (118 lb)   Height: 162.6 cm (64\")       Physical Exam  Constitutional:       Appearance: Normal appearance. She is well-developed.   Genitourinary:      Pelvic exam was performed with patient in the lithotomy position.      Vulva, urethra, bladder, vagina and uterus normal.      No cervical motion tenderness or lesion.      Right adnexa not " tender or full.      Left adnexa not tender or full.   HENT:      Right Ear: External ear normal.      Left Ear: External ear normal.      Nose: Nose normal.   Eyes:      Conjunctiva/sclera: Conjunctivae normal.   Neck:      Thyroid: No thyromegaly.   Cardiovascular:      Rate and Rhythm: Normal rate and regular rhythm.      Heart sounds: Normal heart sounds.   Pulmonary:      Effort: Pulmonary effort is normal.      Breath sounds: No stridor. No wheezing.   Chest:      Breasts:         Right: No mass or nipple discharge.         Left: No mass or nipple discharge.   Abdominal:      Palpations: Abdomen is soft. There is no mass.      Tenderness: There is no guarding or rebound.   Musculoskeletal:         General: Normal range of motion.      Cervical back: Normal range of motion and neck supple.   Neurological:      Mental Status: She is alert.      Coordination: Coordination normal.   Skin:     General: Skin is warm and dry.   Psychiatric:         Mood and Affect: Mood normal.         Behavior: Behavior normal.         Thought Content: Thought content normal.         Judgment: Judgment normal.   Vitals and nursing note reviewed. Exam conducted with a chaperone present.         Diagnoses and all orders for this visit:    1. Visit for gynecologic examination (Primary)        -      Discussed importance of regular screening and breast awareness.  2. Encounter for general counseling and advice on contraceptive management  -     norgestimate-ethinyl estradiol (Sprintec 28) 0.25-35 MG-MCG per tablet; Take 1 tablet by mouth Daily.  Dispense: 28 tablet; Refill: 11        Ez Smallwood MD

## 2023-03-09 ENCOUNTER — OFFICE VISIT (OUTPATIENT)
Dept: OBSTETRICS AND GYNECOLOGY | Facility: CLINIC | Age: 28
End: 2023-03-09
Payer: COMMERCIAL

## 2023-03-09 VITALS
HEIGHT: 64 IN | BODY MASS INDEX: 18.95 KG/M2 | DIASTOLIC BLOOD PRESSURE: 80 MMHG | SYSTOLIC BLOOD PRESSURE: 122 MMHG | WEIGHT: 111 LBS

## 2023-03-09 DIAGNOSIS — Z11.3 SCREENING FOR STDS (SEXUALLY TRANSMITTED DISEASES): ICD-10-CM

## 2023-03-09 DIAGNOSIS — N93.8 DYSFUNCTIONAL UTERINE BLEEDING: ICD-10-CM

## 2023-03-09 DIAGNOSIS — Z01.419 VISIT FOR GYNECOLOGIC EXAMINATION: Primary | ICD-10-CM

## 2023-03-09 LAB
B-HCG UR QL: NEGATIVE
ERYTHROCYTE [DISTWIDTH] IN BLOOD BY AUTOMATED COUNT: 12 % (ref 12.3–15.4)
EXPIRATION DATE: NORMAL
HCG INTACT+B SERPL-ACNC: <1 MIU/ML
HCT VFR BLD AUTO: 38.7 % (ref 34–46.6)
HGB BLD-MCNC: 13.4 G/DL (ref 12–15.9)
INTERNAL NEGATIVE CONTROL: NEGATIVE
INTERNAL POSITIVE CONTROL: POSITIVE
Lab: NORMAL
MCH RBC QN AUTO: 32.1 PG (ref 26.6–33)
MCHC RBC AUTO-ENTMCNC: 34.6 G/DL (ref 31.5–35.7)
MCV RBC AUTO: 92.6 FL (ref 79–97)
PLATELET # BLD AUTO: 328 10*3/MM3 (ref 140–450)
RBC # BLD AUTO: 4.18 10*6/MM3 (ref 3.77–5.28)
TSH SERPL DL<=0.005 MIU/L-ACNC: 0.91 UIU/ML (ref 0.27–4.2)
WBC # BLD AUTO: 10.72 10*3/MM3 (ref 3.4–10.8)

## 2023-03-09 PROCEDURE — 81025 URINE PREGNANCY TEST: CPT | Performed by: OBSTETRICS & GYNECOLOGY

## 2023-03-09 PROCEDURE — 99395 PREV VISIT EST AGE 18-39: CPT | Performed by: OBSTETRICS & GYNECOLOGY

## 2023-03-09 NOTE — PROGRESS NOTES
[facility]  [address], [address]  [place], [place] [address]  Phone: [phone] / Fax:  [phone]      2023    [address] [facility] [address]    Provider, No Known    Chief Complaint   Patient presents with   • Gynecologic Exam     Annual Exam, last pap 2-11-20 NL.. Patient c/o bleeding x1 month with clots. She states her bleeding began as her normal menstrual cycle approximately on  and progressed to being heavier with with clots and cramping. Patient is not on birth control. UCG in office is Negative.       Lena Palomares is here for annual gynecologic exam.  HPI - Patient with last normal pap nearly 3 years ago.  She was on contraception but discontinued because she felt it required a long interval in order to conceive.  Her periods are regular until recently; she has been having an extended, prolong cycle that started last month.  She has heavy bleeding with passage of clots.  She is sexually active.    Past Medical History:   Diagnosis Date   • Chlamydia 2020       History reviewed. No pertinent surgical history.    No Known Allergies    Social History     Socioeconomic History   • Marital status: Single   Tobacco Use   • Smoking status: Every Day   Vaping Use   • Vaping Use: Never used   Substance and Sexual Activity   • Alcohol use: Yes     Comment: social   • Drug use: Yes     Types: Marijuana   • Sexual activity: Yes     Partners: Male     Birth control/protection: None       Family History   Problem Relation Age of Onset   • No Known Problems Father    • No Known Problems Mother    • Heart attack Paternal Grandfather    • Cancer Maternal Grandfather        Patient's last menstrual period was 2023 (approximate).    OB History        0    Para   0    Term   0       0    AB   0    Living   0       SAB   0    IAB   0    Ectopic   0    Molar   0    Multiple   0    Live Births   0                Vitals:    23 0947   BP: 122/80  "  Weight: 50.3 kg (111 lb)   Height: 162.6 cm (64\")       Physical Exam  Constitutional:       Appearance: Normal appearance. She is well-developed.   Genitourinary:      Bladder and urethral meatus normal.      Right Labia: No tenderness or lesions.     Left Labia: No tenderness or lesions.     No vaginal discharge or tenderness.        Right Adnexa: not tender and not full.     Left Adnexa: not tender and not full.     No cervical motion tenderness or lesion.      Uterus is not enlarged or tender.      No urethral tenderness or hypermobility present.   Breasts:     Right: No mass or nipple discharge.      Left: No mass or nipple discharge.   HENT:      Right Ear: External ear normal.      Left Ear: External ear normal.      Nose: Nose normal.   Eyes:      Conjunctiva/sclera: Conjunctivae normal.   Neck:      Thyroid: No thyromegaly.   Cardiovascular:      Rate and Rhythm: Normal rate and regular rhythm.      Heart sounds: Normal heart sounds.   Pulmonary:      Effort: Pulmonary effort is normal.      Breath sounds: No stridor. No wheezing.   Abdominal:      Palpations: Abdomen is soft. There is no mass.      Tenderness: There is no guarding or rebound.   Musculoskeletal:         General: Normal range of motion.      Cervical back: Normal range of motion and neck supple.   Neurological:      Mental Status: She is alert.      Coordination: Coordination normal.   Skin:     General: Skin is warm and dry.   Psychiatric:         Mood and Affect: Mood normal.         Behavior: Behavior normal.         Thought Content: Thought content normal.         Judgment: Judgment normal.   Vitals reviewed. Exam conducted with a chaperone present.         Diagnoses and all orders for this visit:    1. Visit for gynecologic examination (Primary)  -     IGP, Rfx Aptima HPV ASCU  -     POC Pregnancy, Urine  -     Discussed importance of regular screening, breast awareness.  -     Discussed contraception and timing of stopping " contraception.  -     Discussed preconception issues.  -     Discussed importance of quitting smoking.    2. Dysfunctional uterine bleeding  -     NuSwab VG+ - Swab, Vagina  -     CBC (No Diff)  -     TSH  -     HCG, B-subunit, Quantitative  -     POC Pregnancy, Urine  -     Labs ordered.  Recommend OCP taper in order to mitigate bleeding.  If bleeding does not improve in next 2 weeks, patient to let me know and consideration will be given to performing ultrasound.    3. Screening for STDs (sexually transmitted diseases)  -     HIV-1 / O / 2 Ag / Antibody 4th Generation  -     RPR        Ez Smallwood MD

## 2023-03-10 LAB
HIV 1+2 AB+HIV1 P24 AG SERPL QL IA: NON REACTIVE
RPR SER QL: NORMAL

## 2023-03-12 LAB
A VAGINAE DNA VAG QL NAA+PROBE: ABNORMAL SCORE
BVAB2 DNA VAG QL NAA+PROBE: ABNORMAL SCORE
C ALBICANS DNA VAG QL NAA+PROBE: NEGATIVE
C GLABRATA DNA VAG QL NAA+PROBE: NEGATIVE
C TRACH DNA VAG QL NAA+PROBE: NEGATIVE
MEGA1 DNA VAG QL NAA+PROBE: ABNORMAL SCORE
N GONORRHOEA DNA VAG QL NAA+PROBE: NEGATIVE
T VAGINALIS DNA VAG QL NAA+PROBE: NEGATIVE

## 2023-03-13 ENCOUNTER — TELEPHONE (OUTPATIENT)
Dept: OBSTETRICS AND GYNECOLOGY | Facility: CLINIC | Age: 28
End: 2023-03-13
Payer: COMMERCIAL

## 2023-03-13 RX ORDER — METRONIDAZOLE 500 MG/1
500 TABLET ORAL 2 TIMES DAILY
Qty: 14 TABLET | Refills: 0 | Status: SHIPPED | OUTPATIENT
Start: 2023-03-13 | End: 2023-03-20

## 2023-03-14 NOTE — TELEPHONE ENCOUNTER
Left message for Venera to let her know that Dr Smallwood said all of your labs were normal, except you have a bacterial infection. Dr Smallwood sent in an Rx for metronidazole(flagyl) to your [facility] pharmacy at [address]. While taking flagyl, it can leave a metallic taste in your mouth and you should avoid alcohol, as it can make you sick and vomit. Dr Smallwood also wants to know if your bleeding has improved. Please let him know if it has not improved. Thank you.

## 2023-03-14 NOTE — TELEPHONE ENCOUNTER
Sammie    Let her know that her labs were all normal except for BV.  I called in antibiotics.    Find out if her bleeding has improved also.    Thanks    Selin

## 2023-03-17 LAB
CONV .: NORMAL
CYTOLOGIST CVX/VAG CYTO: NORMAL
CYTOLOGY CVX/VAG DOC CYTO: NORMAL
CYTOLOGY CVX/VAG DOC THIN PREP: NORMAL
DX ICD CODE: NORMAL
HIV 1 & 2 AB SER-IMP: NORMAL
Lab: NORMAL
OTHER STN SPEC: NORMAL
RECOM F/U CVX/VAG CYTO: NORMAL
STAT OF ADQ CVX/VAG CYTO-IMP: NORMAL

## 2023-03-21 ENCOUNTER — TELEPHONE (OUTPATIENT)
Dept: OBSTETRICS AND GYNECOLOGY | Facility: CLINIC | Age: 28
End: 2023-03-21
Payer: COMMERCIAL

## 2023-03-21 NOTE — TELEPHONE ENCOUNTER
Left message for patient to call back. 3-22-23/lw  Left message for patient to call back. 3-21-23/lw    ----- Message from Ez Smallwood MD sent at 3/17/2023  2:24 PM EDT -----  LAW - Patient with inadequate pap.  I would like her to repeat it in 6 months.

## 2023-03-23 NOTE — TELEPHONE ENCOUNTER
"Pt informed of pap result and need to repeat in 6 months.  She asked to speak with medical assistant to explain results, so I put her on hold while MA was triaging another pt.  Call rang back to front, when I answered and asked her if she would like to continue holding or have MA call her back, pt became irate and started yelling.  Pt stated \"I dont know why I am back up front, I said I wanted to speak to the medical assistant\".  I tried to explain to the patient that the call rang back up front after being on hold for a certain amount of time, but pt cut me off and continued yelling, \"I dont understand why the doctor did a pap when I was bleeding if this was going to happen.  I don't even want to see Dr. Smallwood anymore.  I want to see a different doctor\".      Is it ok to ask another provider in our office to accept her care?    Pt # [phone]    "

## 2023-09-14 ENCOUNTER — OFFICE VISIT (OUTPATIENT)
Dept: OBSTETRICS AND GYNECOLOGY | Facility: CLINIC | Age: 28
End: 2023-09-14
Payer: COMMERCIAL

## 2023-09-14 VITALS
SYSTOLIC BLOOD PRESSURE: 102 MMHG | DIASTOLIC BLOOD PRESSURE: 71 MMHG | BODY MASS INDEX: 18.95 KG/M2 | WEIGHT: 111 LBS | HEIGHT: 64 IN

## 2023-09-14 DIAGNOSIS — R87.615 UNSATISFACTORY CERVICAL PAPANICOLAOU SMEAR: Primary | ICD-10-CM

## 2023-09-14 DIAGNOSIS — N93.9 ABNORMAL VAGINAL BLEEDING: ICD-10-CM

## 2023-09-14 LAB
B-HCG UR QL: NEGATIVE
EXPIRATION DATE: NORMAL
INTERNAL NEGATIVE CONTROL: NEGATIVE
INTERNAL POSITIVE CONTROL: POSITIVE
Lab: NORMAL

## 2023-09-14 NOTE — PROGRESS NOTES
"Chief Complaint   Patient presents with    Follow-up     Here for repeat pap. Last pap had insufficient cells        SUBJECTIVE:     Lena Palomares is a 28 y.o.  who presents for repeat pap smear. The patient underwent cervical cancer screening with pap smear on 3/9/23 and it returned unsatisfactory. She denies history of abnormal pap smears. She has received HPV vaccine.   She is also concerned regarding spotting after her period stops. She reports that her period started at the beginning of the month and then stopped and a couple days later she had more spotting with wiping. Denies dysuria, urinary frequency, urinary urgency, abdominal pain, abnormal vaginal discharge. She states she feels like her abdomen is caving in. She is with one sexual partner.    Past Medical History:   Diagnosis Date    Chlamydia 2020      History reviewed. No pertinent surgical history.   Social History     Tobacco Use    Smoking status: Every Day   Vaping Use    Vaping Use: Never used   Substance Use Topics    Alcohol use: Yes     Comment: social    Drug use: Yes     Types: Marijuana     OB History    Para Term  AB Living   0 0 0 0 0 0   SAB IAB Ectopic Molar Multiple Live Births   0 0 0 0 0 0        Review of Systems   Genitourinary:  Positive for vaginal bleeding.     OBJECTIVE:   Vitals:    23 0925   BP: 102/71   Weight: 50.3 kg (111 lb)   Height: 162.6 cm (64.02\")        Physical Exam  Vitals reviewed. Exam conducted with a chaperone present.   Constitutional:       General: She is not in acute distress.  HENT:      Head: Normocephalic and atraumatic.      Right Ear: External ear normal.      Left Ear: External ear normal.   Pulmonary:      Effort: Pulmonary effort is normal. No respiratory distress.   Abdominal:      General: There is no distension.      Palpations: Abdomen is soft.      Tenderness: There is no abdominal tenderness. There is no guarding or rebound.   Genitourinary:     General: Normal " vulva.      Exam position: Lithotomy position.      Labia:         Right: No rash, tenderness, lesion or injury.         Left: No rash, tenderness, lesion or injury.       Urethra: No prolapse or urethral swelling.      Vagina: No vaginal discharge, erythema, tenderness, bleeding or lesions.      Cervix: Normal.   Musculoskeletal:         General: No deformity. Normal range of motion.      Cervical back: Normal range of motion and neck supple.   Lymphadenopathy:      Lower Body: No right inguinal adenopathy. No left inguinal adenopathy.   Skin:     General: Skin is warm and dry.   Neurological:      General: No focal deficit present.      Mental Status: She is alert and oriented to person, place, and time.   Psychiatric:         Mood and Affect: Mood normal.         Behavior: Behavior normal.     UPT: negative     ASSESSMENT:     ICD-10-CM ICD-9-CM   1. Unsatisfactory cervical Papanicolaou smear  R87.615 795.08   2. Abnormal vaginal bleeding  N93.9 623.8       PLAN:   - Collected pap smear for cervical cancer screening given unsatisfactory pap smear 6 months ago.  - She had recent prolonged menstrual cycle with negative UPT today. Last visit, she had labs that returned within normal limits except she was positive for BV. Offered to screen for further vaginal infections today but patient declined after normal appearance on exam. Will obtain urine culture to rule out UTI as cause of abnormal vaginal bleeding since we don't know the source. Advised to continue to monitor and if does not improve, can obtain ultrasound in the future.     See below for orders    Orders Placed This Encounter   Procedures    Urine Culture - Urine, Urine, Random Void     Order Specific Question:   Release to patient     Answer:   Routine Release [[id number]]      Return in about 1 year (around 9/14/2024) for Annual physical.    Jackeline Randall MD

## 2023-09-16 LAB
BACTERIA UR CULT: NORMAL
BACTERIA UR CULT: NORMAL

## 2023-09-18 LAB
CONV .: NORMAL
CYTOLOGIST CVX/VAG CYTO: NORMAL
CYTOLOGY CVX/VAG DOC CYTO: NORMAL
CYTOLOGY CVX/VAG DOC THIN PREP: NORMAL
DX ICD CODE: NORMAL
HIV 1 & 2 AB SER-IMP: NORMAL
OTHER STN SPEC: NORMAL
STAT OF ADQ CVX/VAG CYTO-IMP: NORMAL

## 2024-12-05 ENCOUNTER — OFFICE VISIT (OUTPATIENT)
Dept: OBSTETRICS AND GYNECOLOGY | Facility: CLINIC | Age: 29
End: 2024-12-05
Payer: COMMERCIAL

## 2024-12-05 VITALS
BODY MASS INDEX: 19.74 KG/M2 | DIASTOLIC BLOOD PRESSURE: 65 MMHG | SYSTOLIC BLOOD PRESSURE: 94 MMHG | WEIGHT: 115.6 LBS | HEART RATE: 69 BPM | HEIGHT: 64 IN

## 2024-12-05 DIAGNOSIS — Z01.419 ENCOUNTER FOR WELL WOMAN EXAM WITH ROUTINE GYNECOLOGICAL EXAM: Primary | ICD-10-CM

## 2024-12-05 NOTE — PROGRESS NOTES
"GYN Annual Exam     CC- Here for annual exam.     Lena Palomares is a 29 y.o. female who presents for annual well woman exam. Periods are regular every 28-30 days, lasting  4-7  days. Dysmenorrhea:mild, occurring premenstrually. Cyclic symptoms include none. No intermenstrual bleeding, spotting, or discharge.    OB History          0    Para   0    Term   0       0    AB   0    Living   0         SAB   0    IAB   0    Ectopic   0    Molar   0    Multiple   0    Live Births   0              Currently sexually active with one male partner  Current contraception: coitus interruptus  History of abnormal Pap smear: no  Family history of uterine, colon or ovarian cancer: no  History of abnormal mammogram:  n/a  Family history of breast cancer: no  Last Pap : 3/9/23- unsatisfactory pap smear--> 23- NILM    Past Medical History:   Diagnosis Date    Chlamydia 2020       History reviewed. No pertinent surgical history.    No current outpatient medications on file.    No Known Allergies    Social History     Tobacco Use    Smoking status: Every Day   Vaping Use    Vaping status: Never Used   Substance Use Topics    Alcohol use: Yes     Comment: social    Drug use: Yes     Types: Marijuana       Family History   Problem Relation Age of Onset    No Known Problems Father     No Known Problems Mother     Heart attack Paternal Grandfather     Cancer Maternal Grandfather        Review of Systems   All other systems reviewed and are negative.      BP 94/65   Pulse 69   Ht 162.6 cm (64.02\")   Wt 52.4 kg (115 lb 9.6 oz)   LMP 2024 (Approximate)   BMI 19.83 kg/m²     Physical Exam  Vitals reviewed. Exam conducted with a chaperone present.   Constitutional:       General: She is not in acute distress.  HENT:      Head: Normocephalic and atraumatic.      Right Ear: External ear normal.      Left Ear: External ear normal.   Eyes:      Extraocular Movements: Extraocular movements intact.      Pupils: Pupils " are equal, round, and reactive to light.   Cardiovascular:      Rate and Rhythm: Normal rate.   Pulmonary:      Effort: Pulmonary effort is normal. No respiratory distress.   Chest:   Breasts:     Right: No swelling, bleeding, inverted nipple, mass, nipple discharge, skin change or tenderness.      Left: No swelling, bleeding, inverted nipple, mass, nipple discharge, skin change or tenderness.      Comments: Bilateral nipple piercings  Dense breasts bilaterally.   Abdominal:      General: There is no distension.      Palpations: Abdomen is soft.      Tenderness: There is no abdominal tenderness. There is no guarding or rebound.   Genitourinary:     General: Normal vulva.      Exam position: Lithotomy position.      Labia:         Right: No rash, tenderness, lesion or injury.         Left: No rash, tenderness, lesion or injury.       Urethra: No prolapse or urethral swelling.      Vagina: No vaginal discharge, erythema, tenderness, bleeding or lesions.      Cervix: Normal.      Uterus: Not enlarged, not fixed and not tender.       Adnexa:         Right: No mass, tenderness or fullness.          Left: No mass, tenderness or fullness.     Musculoskeletal:         General: No deformity. Normal range of motion.      Cervical back: Normal range of motion and neck supple.   Lymphadenopathy:      Upper Body:      Right upper body: No supraclavicular or axillary adenopathy.      Left upper body: No supraclavicular or axillary adenopathy.      Lower Body: No right inguinal adenopathy. No left inguinal adenopathy.   Skin:     General: Skin is warm and dry.   Neurological:      General: No focal deficit present.      Mental Status: She is alert and oriented to person, place, and time.   Psychiatric:         Mood and Affect: Mood normal.         Behavior: Behavior normal.         Assessment     1) GYN annual well woman exam.      Plan     1) Breast Health - Clinical breast exam yearly, Self breast awareness monthly  2) Pap - Up  to date. Due in 2026 per [facility] guideliens.   3) Smoking status- current every day smoker. Plans on cutting down this year. Encouraged smoking cessation.   4) Activity recommends - Adult 150-300 min/week of multi-component physical activities that include balance training, aerobic and physical strengthening.    5) Contraception- coitus interruptus. Planning for pregnancy next year and will plan to start a PNV before attempting conception.   6) Follow up prn and one year.       Jackeline Randall MD  12/5/2024  09:19 EST